# Patient Record
Sex: MALE | Race: WHITE | NOT HISPANIC OR LATINO | Employment: FULL TIME | URBAN - NONMETROPOLITAN AREA
[De-identification: names, ages, dates, MRNs, and addresses within clinical notes are randomized per-mention and may not be internally consistent; named-entity substitution may affect disease eponyms.]

---

## 2017-09-28 ENCOUNTER — OFFICE VISIT (OUTPATIENT)
Dept: SURGERY | Facility: CLINIC | Age: 27
End: 2017-09-28

## 2017-09-28 ENCOUNTER — ANESTHESIA EVENT (OUTPATIENT)
Dept: PERIOP | Facility: HOSPITAL | Age: 27
End: 2017-09-28

## 2017-09-28 ENCOUNTER — HOSPITAL ENCOUNTER (OUTPATIENT)
Facility: HOSPITAL | Age: 27
Setting detail: HOSPITAL OUTPATIENT SURGERY
Discharge: HOME | End: 2017-09-28
Attending: SURGERY | Admitting: SURGERY

## 2017-09-28 ENCOUNTER — ANESTHESIA (OUTPATIENT)
Dept: PERIOP | Facility: HOSPITAL | Age: 27
End: 2017-09-28

## 2017-09-28 VITALS
WEIGHT: 166.89 LBS | TEMPERATURE: 97.4 F | DIASTOLIC BLOOD PRESSURE: 84 MMHG | HEIGHT: 71 IN | OXYGEN SATURATION: 97 % | RESPIRATION RATE: 20 BRPM | BODY MASS INDEX: 23.36 KG/M2 | SYSTOLIC BLOOD PRESSURE: 129 MMHG | HEART RATE: 89 BPM

## 2017-09-28 VITALS
SYSTOLIC BLOOD PRESSURE: 116 MMHG | HEIGHT: 70 IN | DIASTOLIC BLOOD PRESSURE: 64 MMHG | WEIGHT: 167 LBS | BODY MASS INDEX: 23.91 KG/M2

## 2017-09-28 DIAGNOSIS — L02.91 ABSCESS: Primary | ICD-10-CM

## 2017-09-28 DIAGNOSIS — L02.91 ABSCESS: ICD-10-CM

## 2017-09-28 LAB — POTASSIUM BLD-SCNC: 3.8 MMOL/L (ref 3.5–5.1)

## 2017-09-28 PROCEDURE — 88304 TISSUE EXAM BY PATHOLOGIST: CPT | Performed by: SURGERY

## 2017-09-28 PROCEDURE — 84132 ASSAY OF SERUM POTASSIUM: CPT | Performed by: ANESTHESIOLOGY

## 2017-09-28 PROCEDURE — 25010000003 CEFAZOLIN PER 500 MG: Performed by: NURSE ANESTHETIST, CERTIFIED REGISTERED

## 2017-09-28 PROCEDURE — 25010000002 SUCCINYLCHOLINE PER 20 MG: Performed by: NURSE ANESTHETIST, CERTIFIED REGISTERED

## 2017-09-28 PROCEDURE — 25010000002 PROPOFOL 10 MG/ML EMULSION: Performed by: NURSE ANESTHETIST, CERTIFIED REGISTERED

## 2017-09-28 PROCEDURE — 25010000002 FENTANYL CITRATE (PF) 100 MCG/2ML SOLUTION: Performed by: NURSE ANESTHETIST, CERTIFIED REGISTERED

## 2017-09-28 PROCEDURE — 25010000002 DEXAMETHASONE PER 1 MG: Performed by: NURSE ANESTHETIST, CERTIFIED REGISTERED

## 2017-09-28 PROCEDURE — 88304 TISSUE EXAM BY PATHOLOGIST: CPT | Performed by: PATHOLOGY

## 2017-09-28 PROCEDURE — 10060 I&D ABSCESS SIMPLE/SINGLE: CPT | Performed by: SURGERY

## 2017-09-28 PROCEDURE — 99203 OFFICE O/P NEW LOW 30 MIN: CPT | Performed by: SURGERY

## 2017-09-28 PROCEDURE — 25010000002 MIDAZOLAM PER 1 MG: Performed by: NURSE ANESTHETIST, CERTIFIED REGISTERED

## 2017-09-28 RX ORDER — FENTANYL CITRATE 50 UG/ML
INJECTION, SOLUTION INTRAMUSCULAR; INTRAVENOUS AS NEEDED
Status: DISCONTINUED | OUTPATIENT
Start: 2017-09-28 | End: 2017-09-28 | Stop reason: SURG

## 2017-09-28 RX ORDER — LIDOCAINE HYDROCHLORIDE 10 MG/ML
0.5 INJECTION, SOLUTION INFILTRATION; PERINEURAL ONCE AS NEEDED
Status: DISCONTINUED | OUTPATIENT
Start: 2017-09-28 | End: 2017-09-28 | Stop reason: HOSPADM

## 2017-09-28 RX ORDER — PROPOFOL 10 MG/ML
VIAL (ML) INTRAVENOUS AS NEEDED
Status: DISCONTINUED | OUTPATIENT
Start: 2017-09-28 | End: 2017-09-28 | Stop reason: SURG

## 2017-09-28 RX ORDER — CLINDAMYCIN PHOSPHATE 900 MG/50ML
900 INJECTION INTRAVENOUS ONCE
Status: DISCONTINUED | OUTPATIENT
Start: 2017-09-28 | End: 2017-09-28 | Stop reason: HOSPADM

## 2017-09-28 RX ORDER — DEXAMETHASONE SODIUM PHOSPHATE 4 MG/ML
INJECTION, SOLUTION INTRA-ARTICULAR; INTRALESIONAL; INTRAMUSCULAR; INTRAVENOUS; SOFT TISSUE AS NEEDED
Status: DISCONTINUED | OUTPATIENT
Start: 2017-09-28 | End: 2017-09-28 | Stop reason: SURG

## 2017-09-28 RX ORDER — HYDROMORPHONE HCL 110MG/55ML
0.5 PATIENT CONTROLLED ANALGESIA SYRINGE INTRAVENOUS
Status: DISCONTINUED | OUTPATIENT
Start: 2017-09-28 | End: 2017-09-28 | Stop reason: HOSPADM

## 2017-09-28 RX ORDER — DIPHENHYDRAMINE HYDROCHLORIDE 50 MG/ML
12.5 INJECTION INTRAMUSCULAR; INTRAVENOUS
Status: DISCONTINUED | OUTPATIENT
Start: 2017-09-28 | End: 2017-09-28 | Stop reason: HOSPADM

## 2017-09-28 RX ORDER — SODIUM CHLORIDE 0.9 % (FLUSH) 0.9 %
3 SYRINGE (ML) INJECTION AS NEEDED
Status: DISCONTINUED | OUTPATIENT
Start: 2017-09-28 | End: 2017-09-28 | Stop reason: HOSPADM

## 2017-09-28 RX ORDER — ACETAMINOPHEN 650 MG/1
650 SUPPOSITORY RECTAL ONCE AS NEEDED
Status: DISCONTINUED | OUTPATIENT
Start: 2017-09-28 | End: 2017-09-28 | Stop reason: HOSPADM

## 2017-09-28 RX ORDER — LABETALOL HYDROCHLORIDE 5 MG/ML
5 INJECTION, SOLUTION INTRAVENOUS
Status: DISCONTINUED | OUTPATIENT
Start: 2017-09-28 | End: 2017-09-28 | Stop reason: HOSPADM

## 2017-09-28 RX ORDER — HYDROCODONE BITARTRATE AND ACETAMINOPHEN 7.5; 325 MG/1; MG/1
1 TABLET ORAL EVERY 6 HOURS PRN
Refills: 0 | COMMUNITY
Start: 2017-09-25

## 2017-09-28 RX ORDER — NALOXONE HCL 0.4 MG/ML
0.2 VIAL (ML) INJECTION AS NEEDED
Status: DISCONTINUED | OUTPATIENT
Start: 2017-09-28 | End: 2017-09-28 | Stop reason: HOSPADM

## 2017-09-28 RX ORDER — HYDROCODONE BITARTRATE AND ACETAMINOPHEN 7.5; 325 MG/1; MG/1
1-2 TABLET ORAL EVERY 6 HOURS PRN
Qty: 20 TABLET | Refills: 0 | Status: SHIPPED | OUTPATIENT
Start: 2017-09-28

## 2017-09-28 RX ORDER — SODIUM CHLORIDE, SODIUM GLUCONATE, SODIUM ACETATE, POTASSIUM CHLORIDE, AND MAGNESIUM CHLORIDE 526; 502; 368; 37; 30 MG/100ML; MG/100ML; MG/100ML; MG/100ML; MG/100ML
1000 INJECTION, SOLUTION INTRAVENOUS CONTINUOUS PRN
Status: DISCONTINUED | OUTPATIENT
Start: 2017-09-28 | End: 2017-09-28 | Stop reason: HOSPADM

## 2017-09-28 RX ORDER — SUCCINYLCHOLINE CHLORIDE 20 MG/ML
INJECTION INTRAMUSCULAR; INTRAVENOUS AS NEEDED
Status: DISCONTINUED | OUTPATIENT
Start: 2017-09-28 | End: 2017-09-28 | Stop reason: SURG

## 2017-09-28 RX ORDER — MIDAZOLAM HYDROCHLORIDE 1 MG/ML
INJECTION INTRAMUSCULAR; INTRAVENOUS AS NEEDED
Status: DISCONTINUED | OUTPATIENT
Start: 2017-09-28 | End: 2017-09-28 | Stop reason: SURG

## 2017-09-28 RX ORDER — CEFAZOLIN SODIUM 1 G/3ML
INJECTION, POWDER, FOR SOLUTION INTRAMUSCULAR; INTRAVENOUS AS NEEDED
Status: DISCONTINUED | OUTPATIENT
Start: 2017-09-28 | End: 2017-09-28 | Stop reason: SURG

## 2017-09-28 RX ORDER — LIDOCAINE HYDROCHLORIDE 20 MG/ML
INJECTION, SOLUTION INFILTRATION; PERINEURAL AS NEEDED
Status: DISCONTINUED | OUTPATIENT
Start: 2017-09-28 | End: 2017-09-28 | Stop reason: SURG

## 2017-09-28 RX ORDER — SULFAMETHOXAZOLE AND TRIMETHOPRIM 800; 160 MG/1; MG/1
1 TABLET ORAL 2 TIMES DAILY
Refills: 0 | COMMUNITY
Start: 2017-09-25

## 2017-09-28 RX ORDER — ONDANSETRON 2 MG/ML
4 INJECTION INTRAMUSCULAR; INTRAVENOUS ONCE AS NEEDED
Status: DISCONTINUED | OUTPATIENT
Start: 2017-09-28 | End: 2017-09-28 | Stop reason: HOSPADM

## 2017-09-28 RX ORDER — HYDROCHLOROTHIAZIDE 12.5 MG/1
1 CAPSULE, GELATIN COATED ORAL DAILY
Refills: 2 | COMMUNITY
Start: 2017-09-26

## 2017-09-28 RX ORDER — ATORVASTATIN CALCIUM 40 MG/1
1 TABLET, FILM COATED ORAL DAILY
Refills: 4 | COMMUNITY
Start: 2017-09-15

## 2017-09-28 RX ORDER — BUPIVACAINE HYDROCHLORIDE AND EPINEPHRINE 5; 5 MG/ML; UG/ML
INJECTION, SOLUTION EPIDURAL; INTRACAUDAL; PERINEURAL AS NEEDED
Status: DISCONTINUED | OUTPATIENT
Start: 2017-09-28 | End: 2017-09-28 | Stop reason: HOSPADM

## 2017-09-28 RX ORDER — EPHEDRINE SULFATE 50 MG/ML
5 INJECTION, SOLUTION INTRAVENOUS ONCE AS NEEDED
Status: DISCONTINUED | OUTPATIENT
Start: 2017-09-28 | End: 2017-09-28 | Stop reason: HOSPADM

## 2017-09-28 RX ORDER — FLUMAZENIL 0.1 MG/ML
0.2 INJECTION INTRAVENOUS AS NEEDED
Status: DISCONTINUED | OUTPATIENT
Start: 2017-09-28 | End: 2017-09-28 | Stop reason: HOSPADM

## 2017-09-28 RX ORDER — ACETAMINOPHEN 325 MG/1
650 TABLET ORAL ONCE AS NEEDED
Status: DISCONTINUED | OUTPATIENT
Start: 2017-09-28 | End: 2017-09-28 | Stop reason: HOSPADM

## 2017-09-28 RX ADMIN — SUCCINYLCHOLINE CHLORIDE 160 MG: 20 INJECTION, SOLUTION INTRAMUSCULAR; INTRAVENOUS at 13:20

## 2017-09-28 RX ADMIN — MIDAZOLAM 2 MG: 1 INJECTION INTRAMUSCULAR; INTRAVENOUS at 13:13

## 2017-09-28 RX ADMIN — LIDOCAINE HYDROCHLORIDE 80 MG: 20 INJECTION, SOLUTION INFILTRATION; PERINEURAL at 13:20

## 2017-09-28 RX ADMIN — CEFAZOLIN SODIUM 2 G: 1 INJECTION, POWDER, FOR SOLUTION INTRAMUSCULAR; INTRAVENOUS at 13:30

## 2017-09-28 RX ADMIN — PROPOFOL 200 MG: 10 INJECTION, EMULSION INTRAVENOUS at 13:20

## 2017-09-28 RX ADMIN — FENTANYL CITRATE 100 MCG: 50 INJECTION, SOLUTION INTRAMUSCULAR; INTRAVENOUS at 13:20

## 2017-09-28 RX ADMIN — SODIUM CHLORIDE, SODIUM GLUCONATE, SODIUM ACETATE, POTASSIUM CHLORIDE, AND MAGNESIUM CHLORIDE 1000 ML: 526; 502; 368; 37; 30 INJECTION, SOLUTION INTRAVENOUS at 12:43

## 2017-09-28 RX ADMIN — DEXAMETHASONE SODIUM PHOSPHATE 5 MG: 4 INJECTION, SOLUTION INTRAMUSCULAR; INTRAVENOUS at 13:39

## 2017-09-28 NOTE — ANESTHESIA POSTPROCEDURE EVALUATION
Patient: Jonah Davenport Stone    Procedure Summary     Date Anesthesia Start Anesthesia Stop Room / Location    09/28/17 1315 1403 [facility] OR 10 / [facility]       Procedure Diagnosis Surgeon Provider    INCISION AND DRAINAGE ABSCESS right neck (Right ) Abscess  (Abscess [L02.91]) MD James Sheldon MD          Anesthesia Type: general  Last vitals  BP   136/78 (09/28/17 1358)    Temp   99.7 °F (37.6 °C) (09/28/17 1358)    Pulse   102 (09/28/17 1358)   Resp   20 (09/28/17 1358)    SpO2   100 % (09/28/17 1358)      Post Anesthesia Care and Evaluation    Patient location during evaluation: PACU  Patient participation: complete - patient participated  Level of consciousness: awake and alert  Airway patency: patent  Anesthetic complications: No anesthetic complications    Cardiovascular status: acceptable  Respiratory status: acceptable  Hydration status: acceptable

## 2017-09-28 NOTE — ANESTHESIA PREPROCEDURE EVALUATION
Anesthesia Evaluation     NPO Solid Status: > 8 hours  NPO Liquid Status: > 8 hours     Airway   Mallampati: II  TM distance: >3 FB  Neck ROM: full  no difficulty expected  Dental - normal exam     Pulmonary     breath sounds clear to auscultation  (+) a smoker Current,   Cardiovascular     Rhythm: regular  Rate: normal        Neuro/Psych  GI/Hepatic/Renal/Endo      Musculoskeletal     Abdominal    Substance History      OB/GYN          Other            Phys Exam Other: Right neck draining lesion probably infected cyst.  Chronic cyst which has expanded in size and has begun to drain.  Plan for RSI and intubation.                                Anesthesia Plan    ASA 2     general     intravenous induction   Anesthetic plan and risks discussed with patient.

## 2017-09-28 NOTE — PROGRESS NOTES
Subjective   Jonah Perez is a 27 y.o. male.   Referral from [facility] neck cyst  History of Present Illness   Mr. Perez is 27-year-old gentleman with several year history of epidermal cyst on his right neck.  ITs never given him a problem before.  But a week ago started hurting and swelling and draining.  He is about advice about the improvement.  Pain is rhinitis constant.  Discharging pus today.  Lortab helps the pain.    Past history of high blood pressure and high cholesterol.    Past surgical history of tonsillectomy.      Current Outpatient Prescriptions:   •  atorvastatin (LIPITOR) 40 MG tablet, Take 1 tablet by mouth Daily., Disp: , Rfl: 4  •  hydrochlorothiazide (MICROZIDE) 12.5 MG capsule, Take 1 capsule by mouth Daily., Disp: , Rfl: 2  •  HYDROcodone-acetaminophen (NORCO) 7.5-325 MG per tablet, Take 1 tablet by mouth Every 6 (Six) Hours As Needed., Disp: , Rfl: 0  •  sulfamethoxazole-trimethoprim (BACTRIM DS,SEPTRA DS) 800-160 MG per tablet, Take 1 tablet by mouth 2 (Two) Times a Day., Disp: , Rfl: 0    Allergic to penicillin.    Family history of diabetes and high blood pressure.    Social history patient is a  at time while a single.  He smokes pack a day cigarettes doesn't use alcohol.  The following portions of the patient's history were reviewed and updated as appropriate: allergies, current medications, past family history, past medical history, past social history, past surgical history and problem list.    Review of Systems   Constitutional: Negative.    HENT: Negative.    Eyes: Negative.    Respiratory: Negative.    Cardiovascular: Negative.    Gastrointestinal: Negative.    Endocrine: Negative.    Genitourinary: Negative.    Musculoskeletal: Negative.    Skin: Negative.    Allergic/Immunologic: Negative.    Neurological: Negative.    Hematological: Negative.    Psychiatric/Behavioral: Negative.        Objective   Physical Exam   Constitutional: He is oriented to person,  place, and time. He appears well-developed.   HENT:   Head: Normocephalic and atraumatic.   Eyes: Pupils are equal, round, and reactive to light.   Neck: Normal range of motion. Neck supple.   Cardiovascular: Normal rate, regular rhythm, normal heart sounds and intact distal pulses.    Pulmonary/Chest: Effort normal and breath sounds normal.   Abdominal: Soft. Bowel sounds are normal.   Musculoskeletal: Normal range of motion.   Neurological: He is alert and oriented to person, place, and time.   Skin: Skin is warm and dry. There is erythema.   Psychiatric: He has a normal mood and affect. His behavior is normal.   On examination of his right anchors raise swollen erythematous 2 cm lesion consistent with an infected epidermal cyst.  He is draining parents and fluctuant and indurated.    Assessment/Plan   Jonah was seen today for cyst.    Diagnoses and all orders for this visit:    Abscess  -     Case Request; Standing  -     clindamycin (CLEOCIN) 900 mg in dextrose (D5W) 5 % 100 mL IVPB; Infuse 900 mg into a venous catheter 1 (One) Time.  -     Case Request    Other orders  -     Follow Anesthesia Guidelines / Standing Orders; Future  -     Follow Anesthesia Guidelines / Standing Orders; Standing  -     Verify NPO Status; Standing  -     Obtain Informed Consent; Standing  -     SCD (Sequential Compression Device) - Place on Patient in Pre-Op; Standing  -     Clip Operative Site; Standing  -     Have Patient Void Prior to Procedure; Standing     Mr. Perez is a 27-year-old gentleman infected right neck epidermal inclusion cyst.  Plan on taking the OR today for incision and drainage.  He has not eaten today and it is begun so as to get done today.  The procedure and risks, benefits, and alternatives to the plan have been discussed patient and he understands and agrees.  Thank you for the consult.

## 2017-09-28 NOTE — ANESTHESIA PROCEDURE NOTES
Airway  Urgency: elective    Airway not difficult    General Information and Staff    Patient location during procedure: OR  CRNA: STEPHANIE COMBS    Indications and Patient Condition  Indications for airway management: airway protection    Preoxygenated: yes  Mask difficulty assessment: 0 - not attempted    Final Airway Details  Final airway type: endotracheal airway      Successful airway: ETT  Cuffed: yes   Successful intubation technique: direct laryngoscopy  Endotracheal tube insertion site: oral  Blade: Berta  Blade size: #4  ETT size: 7.5 mm  Cormack-Lehane Classification: grade IIa - partial view of glottis  Placement verified by: chest auscultation and capnometry   Cuff volume (mL): 8  Measured from: lips  ETT to lips (cm): 21  Number of attempts at approach: 1

## 2017-10-02 LAB
LAB AP CASE REPORT: NORMAL
Lab: NORMAL
PATH REPORT.FINAL DX SPEC: NORMAL
PATH REPORT.GROSS SPEC: NORMAL

## 2017-10-05 ENCOUNTER — OFFICE VISIT (OUTPATIENT)
Dept: SURGERY | Facility: CLINIC | Age: 27
End: 2017-10-05

## 2017-10-05 VITALS
BODY MASS INDEX: 23.94 KG/M2 | SYSTOLIC BLOOD PRESSURE: 112 MMHG | DIASTOLIC BLOOD PRESSURE: 76 MMHG | HEIGHT: 71 IN | WEIGHT: 171 LBS

## 2017-10-05 DIAGNOSIS — L02.91 ABSCESS: Primary | ICD-10-CM

## 2017-10-05 PROCEDURE — 99024 POSTOP FOLLOW-UP VISIT: CPT | Performed by: SURGERY

## 2018-08-06 ENCOUNTER — TRANSCRIBE ORDERS (OUTPATIENT)
Dept: GENERAL RADIOLOGY | Facility: CLINIC | Age: 28
End: 2018-08-06

## 2018-08-06 DIAGNOSIS — M54.6 PAIN IN THORACIC SPINE: ICD-10-CM

## 2018-08-06 DIAGNOSIS — M25.511 RIGHT SHOULDER PAIN, UNSPECIFIED CHRONICITY: Primary | ICD-10-CM

## 2018-08-13 ENCOUNTER — TRANSCRIBE ORDERS (OUTPATIENT)
Dept: PHYSICAL THERAPY | Facility: HOSPITAL | Age: 28
End: 2018-08-13

## 2018-08-13 DIAGNOSIS — S33.5XXA LUMBAR BACK SPRAIN, INITIAL ENCOUNTER: Primary | ICD-10-CM

## 2018-08-15 ENCOUNTER — HOSPITAL ENCOUNTER (OUTPATIENT)
Dept: PHYSICAL THERAPY | Facility: HOSPITAL | Age: 28
Setting detail: THERAPIES SERIES
Discharge: HOME | End: 2018-08-15

## 2018-08-15 DIAGNOSIS — S33.5XXA LUMBAR BACK SPRAIN, INITIAL ENCOUNTER: ICD-10-CM

## 2018-08-15 PROCEDURE — 97161 PT EVAL LOW COMPLEX 20 MIN: CPT | Performed by: PHYSICAL THERAPIST

## 2018-08-15 PROCEDURE — 97140 MANUAL THERAPY 1/> REGIONS: CPT | Performed by: PHYSICAL THERAPIST

## 2018-08-15 PROCEDURE — 97110 THERAPEUTIC EXERCISES: CPT | Performed by: PHYSICAL THERAPIST

## 2018-08-15 PROCEDURE — G0283 ELEC STIM OTHER THAN WOUND: HCPCS | Performed by: PHYSICAL THERAPIST

## 2018-08-15 NOTE — THERAPY EVALUATION
Outpatient Physical Therapy Ortho Initial Evaluation  [facility]     Patient Name: Jonah Perez  : 1990  MRN: [id number]  Today's Date: 8/15/2018        Attendance    Authorized 5   Pre Rx pain 6   Post Rx pain 5   % improvement N/A   MD follow up    Recert date            Visit Date: 08/15/2018    Patient Active Problem List   Diagnosis   (none) - all problems resolved or deleted        Past Medical History:   Diagnosis Date   • Hypertension         Past Surgical History:   Procedure Laterality Date   • INCISION AND DRAINAGE ABSCESS Right 2017    Procedure: INCISION AND DRAINAGE ABSCESS right neck;  Surgeon: Jeramy Moss MD;  Location: [facility];  Service:    • TONSILLECTOMY     Medicines: hydrochlorothiazide, flexeril, BP med??? name  Allergies; penicillins    Visit Dx:     ICD-10-CM ICD-9-CM   1. Lumbar back sprain, initial encounter S33.5XXA 847.2             Patient History     Row Name 08/15/18 1300             History    Chief Complaint Pain  -DD      Type of Pain Back pain  -DD      Date Current Problem(s) Began 18  -DD      Brief Description of Current Complaint shoveling plastic residue.into a tall box.   -DD      Hand Dominance right-handed  -DD      Occupation/sports/leisure activities [employer] in [place]   restricted duty  -DD      What clinical tests have you had for this problem? X-ray  -DD      Results of Clinical Tests negative  -DD         Pain     Pain Location Back;Rib cage  -DD      Pain at Present 6  -DD      Pain Frequency Constant/continuous  -DD        User Key  (r) = Recorded By, (t) = Taken By, (c) = Cosigned By    Initials Name Provider Type    DD Josiane Garza PT Physical Therapist                PT Ortho     Row Name 08/15/18 1300       Subjective Pain    Able to rate subjective pain? yes  -DD    Pre-Treatment Pain Level 3  -DD    Subjective Pain Comment movement 6-7  -DD       Posture/Observations    Posture/Observations Comments  "slumped rounded shouder posture   limited UE to 130  -DD       Special Tests/Palpation    Special Tests/Palpation --   R Thor paraspinals post, ribs flared, R SI tender.  -DD       General ROM    Head/Neck/Trunk --   hypomobile thoracic spine  -DD    GENERAL ROM COMMENTS lumbar flexion, mid shin, ext 20, lateral flexion 8 \" form right and 10 from Left.  -DD       MMT (Manual Muscle Testing)    Additional Documentation --   5/5  -DD       Sensation    Sensation WNL? WNL  -DD    Light Touch No apparent deficits  -DD      User Key  (r) = Recorded By, (t) = Taken By, (c) = Cosigned By    Initials Name Provider Type    Josiane Cruz, PT Physical Therapist                      Therapy Education  Given: HEP  Program: New  How Provided: Verbal, Written  Provided to: Patient  Level of Understanding: Verbalized, Demonstrated           PT OP Goals     Row Name 08/15/18 1300          PT Short Term Goals    STG Date to Achieve 09/05/18  -DD     STG 1 Pt will have normal lumbar mobility to prior level of function  -DD     STG 2 Pt will be nontender over right thoracic paraspinals  -DD     STG 3 Pt will have Mod Oswestry score 20% or lowe  -DD     STG 4 pt will have painfree UE ROM  -DD        Time Calculation    PT Goal Re-Cert Due Date 09/05/18  -DD       User Key  (r) = Recorded By, (t) = Taken By, (c) = Cosigned By    Initials Name Provider Type    Josiane Cruz, PT Physical Therapist                PT Assessment/Plan     Row Name 08/15/18 1355          PT Assessment    Functional Limitations Limitation in home management;Performance in leisure activities;Performance in work activities;Performance in self-care ADL  -DD     Impairments Pain;Range of motion;Poor body mechanics;Posture;Joint mobility;Impaired flexibility  -DD     Assessment Comments Pt has biomechanical back pain from strain of muscles and spasm.  Will beneifit from mobilization and stretching, modalities  -DD     Please refer to paper " "survey for additional self-reported information Yes  -DD     Rehab Potential Good  -DD     Patient/caregiver participated in establishment of treatment plan and goals Yes  -DD     Patient would benefit from skilled therapy intervention Yes  -DD        PT Plan    PT Frequency 3x/week;2x/week  -DD     Predicted Duration of Therapy Intervention (Therapy Eval) 3 weeks  -DD     Planned CPT's? PT EVAL LOW COMPLEXITY: 65946;PT THER PROC EA 15 MIN: 32713;PT MANUAL THERAPY EA 15 MIN: 75388;PT ELECTRICAL STIM UNATTEND: ;PT HOT/COLD PACK WC NONMCARE: 91349  -DD     PT Plan Comments Pt approved for 5 vists at this time, progress stretching and scapular stability as able. Mobes of ribs and thoracic spine,  watch lumbars and SI joint.  estim and ice.  -DD       User Key  (r) = Recorded By, (t) = Taken By, (c) = Cosigned By    Initials Name Provider Type    Josiane Cruz, PT Physical Therapist                Modalities     Row Name 08/15/18 1300             Ice    Ice Applied Yes  -DD      Location thoracic paraspinals  -DD      Rx Minutes 15 mins  -DD      Ice S/P Rx Yes  -DD         ELECTRICAL STIMULATION    Attended/Unattended Unattended  -DD      Stimulation Type IFC  -DD      Location/Electrode Placement/Other thoracic paraspinals  -DD        User Key  (r) = Recorded By, (t) = Taken By, (c) = Cosigned By    Initials Name Provider Type    Josiane Cruz, PT Physical Therapist              Exercises     Row Name 08/15/18 1300             Subjective Pain    Able to rate subjective pain? yes  -DD      Pre-Treatment Pain Level 3  -DD      Subjective Pain Comment movement 6-7  -DD         Exercise 1    Exercise Name 1 scap squeezes  -DD      Reps 1 15  -DD         Exercise 2    Exercise Name 2 posterior shoulder stretch  -DD      Reps 2 2/30\" sabrina  -DD         Exercise 3    Exercise Name 3 pec stretch  -DD      Reps 3 3/30\"  -DD         Exercise 4    Exercise Name 4 mid back stretch  -DD      Reps 4 3/30\" " " -DD         Exercise 5    Exercise Name 5 lat stretch  -DD      Reps 5 3/30\"  -DD        User Key  (r) = Recorded By, (t) = Taken By, (c) = Cosigned By    Initials Name Provider Type    DD Josiane Garza, PT Physical Therapist           Manual Rx (last 36 hours)      Manual Treatments     Row Name 08/15/18 1300             Manual Rx 1    Manual Rx 1 Location Mobes PA thoracics/cervical  -DD      Manual Rx 1 Grade 3-4-5  -DD      Manual Rx 1 Duration 9'  -DD         Manual Rx 2    Manual Rx 2 Location throacic paraspinals  -DD      Manual Rx 2 Type cuppping  -DD      Manual Rx 2 Duration 5  -DD        User Key  (r) = Recorded By, (t) = Taken By, (c) = Cosigned By    Initials Name Provider Type    DD Josiane Garza, PT Physical Therapist                      Outcome Measure Options: Modifed Owestry  Modified Oswestry  Modified Oswestry Score/Comments: 32%      Time Calculation:     Therapy Suggested Charges     Code   Minutes Charges    None             Start Time: 1302  Stop Time: 1405  Time Calculation (min): 63 min     Therapy Charges for Today     Code Description Service Date Service Provider Modifiers Qty    00217384303 HC PT EVAL LOW COMPLEXITY 1 8/15/2018 Josiane Garza, PT GP 1    66021152369 HC PT ELECTRICAL STIM UNATTENDED 8/15/2018 Josiane Garza, PT  1    74549672497 HC PT THER PROC EA 15 MIN 8/15/2018 Josiane Garza, PT GP 1    09273267713 HC PT MANUAL THERAPY EA 15 MIN 8/15/2018 Josiane Garza, PT GP 1          PT G-Codes  Outcome Measure Options: Modifed Owestry         Josiane Garza, PT, ATC, DPT  8/15/2018      "

## 2018-08-17 ENCOUNTER — HOSPITAL ENCOUNTER (OUTPATIENT)
Dept: PHYSICAL THERAPY | Facility: HOSPITAL | Age: 28
Setting detail: THERAPIES SERIES
Discharge: HOME | End: 2018-08-17

## 2018-08-17 DIAGNOSIS — S33.5XXA LUMBAR BACK SPRAIN, INITIAL ENCOUNTER: Primary | ICD-10-CM

## 2018-08-17 PROCEDURE — 97110 THERAPEUTIC EXERCISES: CPT

## 2018-08-17 PROCEDURE — G0283 ELEC STIM OTHER THAN WOUND: HCPCS

## 2018-08-17 PROCEDURE — 97140 MANUAL THERAPY 1/> REGIONS: CPT

## 2018-08-17 NOTE — THERAPY TREATMENT NOTE
"    Outpatient Physical Therapy Ortho Treatment Note  [facility]     Patient Name: Jonah Perez  : 1990  MRN: [id number]  Today's Date: 2018      Visit Date: 2018    Visit Dx:    ICD-10-CM ICD-9-CM   1. Lumbar back sprain, initial encounter S33.5XXA 847.2       Patient Active Problem List   Diagnosis   (none) - all problems resolved or deleted        Past Medical History:   Diagnosis Date   • Hypertension         Past Surgical History:   Procedure Laterality Date   • INCISION AND DRAINAGE ABSCESS Right 2017    Procedure: INCISION AND DRAINAGE ABSCESS right neck;  Surgeon: Jeramy Moss MD;  Location: [facility];  Service:    • TONSILLECTOMY               PT Ortho     Row Name 08/15/18 1300       Subjective Pain    Able to rate subjective pain? yes  -DD    Pre-Treatment Pain Level 3  -DD    Subjective Pain Comment movement 6-7  -DD       Posture/Observations    Posture/Observations Comments slumped rounded shouder posture   limited UE to 130  -DD       Special Tests/Palpation    Special Tests/Palpation --   R Thor paraspinals post, ribs flared, R SI tender.  -DD       General ROM    Head/Neck/Trunk --   hypomobile thoracic spine  -DD    GENERAL ROM COMMENTS lumbar flexion, mid shin, ext 20, lateral flexion 8 \" form right and 10 from Left.  -DD       MMT (Manual Muscle Testing)    Additional Documentation --   5/5  -DD       Sensation    Sensation WNL? WNL  -DD    Light Touch No apparent deficits  -DD      User Key  (r) = Recorded By, (t) = Taken By, (c) = Cosigned By    Initials Name Provider Type    Josiane Cruz PT Physical Therapist                                Modalities     Row Name 18 0951             Ice    Ice Applied Yes  -MS      Location thoracic paraspinals  -MS      Rx Minutes 15 mins  -MS      Ice S/P Rx Yes  -MS         ELECTRICAL STIMULATION    Attended/Unattended Unattended  -MS      Stimulation Type IFC  -MS      Location/Electrode Placement/Other " "thoracic paraspinals  -MS        User Key  (r) = Recorded By, (t) = Taken By, (c) = Cosigned By    Initials Name Provider Type    Omar Leal, PT Physical Therapist                Exercises     Row Name 08/17/18 0951             Subjective Pain    Able to rate subjective pain? yes  -MS      Pre-Treatment Pain Level 2  -MS      Subjective Pain Comment 6 with movement  -MS         Exercise 1    Exercise Name 1 scap squeezes  -MS      Reps 1 15  -MS      Time 1 10\" hold  -MS         Exercise 2    Exercise Name 2 posterior shoulder stretch  -MS      Reps 2 2/30\" sabrina  -MS         Exercise 3    Exercise Name 3 pec stretch  -MS      Reps 3 3/30\"  -MS         Exercise 4    Exercise Name 4 mid back stretch  -MS      Reps 4 3/30\"  -MS         Exercise 5    Exercise Name 5 lat stretch  -MS      Reps 5 3/30\"  -MS         Exercise 6    Exercise Name 6 thoracic rotational stretch on mat table with knee on roller  -MS      Cueing 6 Verbal;Demo  -MS      Sets 6 1  -MS      Reps 6 3  -MS      Time 6 X 1' B  -MS        User Key  (r) = Recorded By, (t) = Taken By, (c) = Cosigned By    Initials Name Provider Type    Omar Leal, PT Physical Therapist                        Manual Rx (last 36 hours)      Manual Treatments     Row Name 08/17/18 0951             Manual Rx 1    Manual Rx 1 Location STM to R paraspinals to medial/inferior border of scapula  -MS      Manual Rx 1 Duration 10  -MS        User Key  (r) = Recorded By, (t) = Taken By, (c) = Cosigned By    Initials Name Provider Type    Omar Leal, PT Physical Therapist                             Time Calculation:   Start Time: 0951  Stop Time: 1102  Time Calculation (min): 71 min  Total Timed Code Minutes- PT: 51 minute(s)  Therapy Suggested Charges     Code   Minutes Charges    None           Therapy Charges for Today     Code Description Service Date Service Provider Modifiers Qty    87096674665  PT ELECTRICAL STIM UNATTENDED 8/17/2018 " Omar Shearer, PT  1    45627834143 HC PT MANUAL THERAPY EA 15 MIN 8/17/2018 Omar Shearer, PT GP 1    70364549687 HC PT THER PROC EA 15 MIN 8/17/2018 Omar Shearer, PT GP 2                    Omar Shearer, PT  8/17/2018

## 2018-08-21 ENCOUNTER — HOSPITAL ENCOUNTER (OUTPATIENT)
Dept: PHYSICAL THERAPY | Facility: HOSPITAL | Age: 28
Setting detail: THERAPIES SERIES
Discharge: HOME | End: 2018-08-21

## 2018-08-21 DIAGNOSIS — S33.5XXA LUMBAR BACK SPRAIN, INITIAL ENCOUNTER: Primary | ICD-10-CM

## 2018-08-21 PROCEDURE — 97110 THERAPEUTIC EXERCISES: CPT

## 2018-08-21 PROCEDURE — 97032 APPL MODALITY 1+ESTIM EA 15: CPT

## 2018-08-21 NOTE — THERAPY TREATMENT NOTE
Outpatient Physical Therapy Ortho Treatment Note  [facility]   Janey Martínez       Patient Name: Jonah Perez  : 1990  MRN: [id number]  Today's Date: 2018      Visit Date: 2018   Pt reports 0/10 pain pre treatment, 4/10 pain post treatment  Reports ??% of improvement.  Attended 3/3 visits.  Insurance available: 5 visit   Next MD appt: 2018.  Recertification: 2018.    Visit Dx:    ICD-10-CM ICD-9-CM   1. Lumbar back sprain, initial encounter S33.5XXA 847.2       Patient Active Problem List   Diagnosis   (none) - all problems resolved or deleted        Past Medical History:   Diagnosis Date   • Hypertension         Past Surgical History:   Procedure Laterality Date   • INCISION AND DRAINAGE ABSCESS Right 2017    Procedure: INCISION AND DRAINAGE ABSCESS right neck;  Surgeon: Jeramy Moss MD;  Location: [facility];  Service:    • TONSILLECTOMY                               PT Assessment/Plan     Row Name 18 1300          PT Assessment    Assessment Comments (P)  Muscle spasms R paraspinals and R latissimus dorsi. Some improvement with MFR and thoracic mobes.Increased pain reported post-Esim   -        PT Plan    PT Frequency (P)  3x/week;2x/week  -     Predicted Duration of Therapy Intervention (Therapy Eval) (P)  3 weeks  -     PT Plan Comments (P)  Continue with POC established at initial visit--stretching, mobes, scap strengthening   -       User Key  (r) = Recorded By, (t) = Taken By, (c) = Cosigned By    Initials Name Provider Type     Janey Martínez                 Modalities     Row Name 18 1300             Ice    Ice Applied (P)  Yes  -MC      Location (P)  R thoracic paraspinas and R lat   concurrent with IFC   -MC      Rx Minutes (P)  15 mins  -      Ice Prior to Rx (P)  No  -      Ice S/P Rx (P)  Yes  -         ELECTRICAL STIMULATION    Attended/Unattended (P)  Unattended  -      Stimulation Type (P)  IFC  "  concurrent with ice  -      Location/Electrode Placement/Other (P)  R thoracic paraspinals and R lat  -      66313 - PT Electrical Stimulation (Manual) Minutes (P)  15  -MC        User Key  (r) = Recorded By, (t) = Taken By, (c) = Cosigned By    Initials Name Provider Type     Janey Martínez                 Exercises     Row Name 08/21/18 1300             Subjective Comments    Subjective Comments (P)  Not really having any pain except with twisting. Sits in a chair at work.   -         Subjective Pain    Able to rate subjective pain? (P)  yes  -      Pre-Treatment Pain Level (P)  0  -      Post-Treatment Pain Level (P)  4  -      Subjective Pain Comment (P)  4 with twisting   -         Exercise 1    Exercise Name 1 (P)  Tball rollouts-mid, L, R  -      Sets 1 (P)  3  -      Time 1 (P)  30\"  -      Additional Comments (P)  each direction  -         Exercise 2    Exercise Name 2 (P)  Posterior shoulder stretch  -      Sets 2 (P)  3  -      Time 2 (P)  30\"  -         Exercise 3    Exercise Name 3 (P)  Doorway pec stretch  -      Sets 3 (P)  3  -      Time 3 (P)  30\"  -         Exercise 4    Exercise Name 4 (P)  Lat stretch  -      Sets 4 (P)  3  -      Time 4 (P)  30\"  -         Exercise 5    Exercise Name 5 (P)  manual--see manual   -         Exercise 6    Exercise Name 6 (P)  Scap squeezes  -      Sets 6 (P)  2  -      Reps 6 (P)  15  -         Exercise 7    Exercise Name 7 (P)  Wall push up plus   -      Reps 7 (P)  20  -        User Key  (r) = Recorded By, (t) = Taken By, (c) = Cosigned By    Initials Name Provider Type     Janey Martínez                         Manual Rx (last 36 hours)      Manual Treatments     Row Name 08/21/18 1300             Manual Rx 1    Manual Rx 1 Location (P)  PA thoracic joint mobes   -      Manual Rx 1 Grade (P)  3  -      Manual Rx 1 Duration (P)  2 min  -         Manual Rx 2    " Manual Rx 2 Location (P)  MFR R lat dorsi, low-trap   -      Manual Rx 2 Duration (P)  3 min  -        User Key  (r) = Recorded By, (t) = Taken By, (c) = Cosigned By    Initials Name Provider Type    Janey Dover                 PT OP Goals     Row Name 08/21/18 1300          PT Short Term Goals    STG Date to Achieve (P)  09/05/18  -     STG 1 (P)  Pt will have normal lumbar mobility to prior level of function  -     STG 1 Progress (P)  Not Met  -     STG 2 (P)  Pt will be nontender over right thoracic paraspinals  -     STG 2 Progress (P)  Not Met  -     STG 3 (P)  Pt will have Mod Oswestry score 20% or lowe  -     STG 3 Progress (P)  Not Met  -     STG 4 (P)  pt will have painfree UE ROM  -     STG 4 Progress (P)  Not Met  -        Time Calculation    PT Goal Re-Cert Due Date (P)  09/05/18  -       User Key  (r) = Recorded By, (t) = Taken By, (c) = Cosigned By    Initials Name Provider Type    Janey Dover                          Time Calculation:   Start Time: (P) 1258  Stop Time: (P) 1353  Time Calculation (min): (P) 55 min  Therapy Suggested Charges     Code   Minutes Charges    46378 (CPT®) Hc Pt Neuromusc Re Education Ea 15 Min      25931 (CPT®) Hc Pt Ther Proc Ea 15 Min      28990 (CPT®) Hc Gait Training Ea 15 Min      30099 (CPT®) Hc Pt Therapeutic Act Ea 15 Min      97950 (CPT®) Hc Pt Manual Therapy Ea 15 Min      25314 (CPT®) Hc Pt Ther Massage- Per 15 Min      99875 (CPT®) Hc Pt Iontophoresis Ea 15 Min      72125 (CPT®) Hc Pt Elec Stim Ea-Per 15 Min 15 1    54184 (CPT®) Hc Pt Ultrasound Ea 15 Min      07275 (CPT®) Hc Pt Self Care/Mgmt/Train Ea 15 Min      89956 (CPT®) Hc Pt Prosthetic (S) Train Initial Encounter, Each 15 Min      42544 (CPT®) Hc Orthotic(S) Mgmt/Train Initial Encounter, Each 15min      68743 (CPT®) Hc Pt Aquatic Therapy Ea 15 Min      17621 (CPT®) Hc Pt Orthotic(S)/Prosthetic(S) Encounter, Each 15 Min      Total   15 1        Therapy Charges for Today     Code Description Service Date Service Provider Modifiers Qty    97146115747 HC PT ELEC STIM EA-PER 15 MIN 8/21/2018 Janey Martínez GP 1    05712406154 HC PT THER SUPP EA 15 MIN 8/21/2018 Janey Martínez GP 1    16917389426 HC PT THER PROC EA 15 MIN 8/21/2018 Janey Martínez GP 3                    Janey Martínez  8/21/2018

## 2018-08-23 ENCOUNTER — HOSPITAL ENCOUNTER (OUTPATIENT)
Dept: PHYSICAL THERAPY | Facility: HOSPITAL | Age: 28
Setting detail: THERAPIES SERIES
Discharge: HOME | End: 2018-08-23

## 2018-08-23 DIAGNOSIS — S33.5XXA LUMBAR BACK SPRAIN, INITIAL ENCOUNTER: Primary | ICD-10-CM

## 2018-08-23 PROCEDURE — G0283 ELEC STIM OTHER THAN WOUND: HCPCS

## 2018-08-23 PROCEDURE — 97110 THERAPEUTIC EXERCISES: CPT

## 2018-08-23 NOTE — THERAPY TREATMENT NOTE
Outpatient Physical Therapy Ortho Treatment Note  [facility]     Patient Name: Jonah Perez  : 1990  MRN: [id number]  Today's Date: 2018      Visit Date: 2018    Sub imp 80%  Visit  (5)  MD 18  Re 18    MD note faxed    Visit Dx:    ICD-10-CM ICD-9-CM   1. Lumbar back sprain, initial encounter S33.5XXA 847.2       Patient Active Problem List   Diagnosis   (none) - all problems resolved or deleted        Past Medical History:   Diagnosis Date   • Hypertension         Past Surgical History:   Procedure Laterality Date   • INCISION AND DRAINAGE ABSCESS Right 2017    Procedure: INCISION AND DRAINAGE ABSCESS right neck;  Surgeon: Jeramy Moss MD;  Location: [facility];  Service:    • TONSILLECTOMY               PT Ortho     Row Name 18 1100       General ROM    GENERAL ROM COMMENTS (P)  Lumbar flex mid shin, R Lat flex Fib head. L lat flex Distal thigh with pain, ext 30 degrees  -SEGUNDO      User Key  (r) = Recorded By, (t) = Taken By, (c) = Cosigned By    Initials Name Provider Type    SEGUNDO Mirza Roe, [employer]                             PT Assessment/Plan     Row Name 18 1147          PT Assessment    Assessment Comments (P)  multiple cavitations with SVETLANA clayton MD note faxed  -SEGUNDO        PT Plan    PT Frequency (P)  3x/week  -SEGUNDO     Predicted Duration of Therapy Intervention (Therapy Eval) (P)  3 weeks  -SEGUNDO     PT Plan Comments (P)  MD apt tomorrow. 1 approved visit remaining  -SEGUNDO       User Key  (r) = Recorded By, (t) = Taken By, (c) = Cosigned By    Initials Name Provider Type    Mirza Sanchez, [employer]                     Exercises     Row Name 18 1100             Subjective Comments    Subjective Comments (P)  Reports no pain with standing, cont pain with rotation. Feels tight  -SEGUNDO         Subjective Pain    Pre-Treatment Pain Level (P)  0  -SEGUNDO         Exercise 1    Exercise Name 1 (P)  Pro II seat 10 UE  -SEGUNDO      Sets 1 (P)  2   -SEGUNDO         Exercise 2    Exercise Name 2 (P)  Door Lat stretch  -SEGUNDO      Sets 2 (P)  3  -SEGUNDO      Time 2 (P)  30  -SEGUNDO         Exercise 3    Exercise Name 3 (P)  Post. shoulder stretch  -SEGUNDO      Sets 3 (P)  3  -SEGUNDO      Time 3 (P)  30  -SEGUNDO         Exercise 4    Exercise Name 4 (P)  Mid back stretch  -SEGUNDO      Sets 4 (P)  3  -SEGUNDO      Time 4 (P)  30  -SEGUNDO         Exercise 5    Exercise Name 5 (P)  Manual see manual  -SEGUNDO         Exercise 6    Exercise Name 6 (P)  Measurements  -SEGUNDO         Exercise 7    Exercise Name 7 (P)  Sidelying thoracic stretch  -SEGUNDO      Sets 7 (P)  3  -SEGUNDO      Time 7 (P)  30  -SEGUNDO        User Key  (r) = Recorded By, (t) = Taken By, (c) = Cosigned By    Initials Name Provider Type    Mirza Sanchez, [facility]                         Manual Rx (last 36 hours)      Manual Treatments     Row Name 08/23/18 1100             Manual Rx 1    Manual Rx 1 Location (P)  PA thoracic joint mobes   -SEGUNDO      Manual Rx 1 Type (P)  Multiple cavitations  -SEGUNDO      Manual Rx 1 Grade (P)  3  -SEGUNDO      Manual Rx 1 Duration (P)  2 min  -SEGUNDO         Manual Rx 2    Manual Rx 2 Location (P)  MFR R lat dorsi, low-trap   -SEGUNDO      Manual Rx 2 Duration (P)  3 min  -SEGUNDO        User Key  (r) = Recorded By, (t) = Taken By, (c) = Cosigned By    Initials Name Provider Type    Mirza Sanchez, [facility]                 PT OP Goals     Row Name 08/23/18 1100          PT Short Term Goals    STG Date to Achieve (P)  09/05/18  -SEGUNDO     STG 1 (P)  Pt will have normal lumbar mobility to prior level of function  -SEGUNDO     STG 1 Progress (P)  Progressing  -SEGUNDO     STG 2 (P)  Pt will be nontender over right thoracic paraspinals  -SEGUNDO     STG 2 Progress (P)  Progressing  -SEGUNDO     STG 3 (P)  Pt will have Mod Oswestry score 20% or lowe  -SEGUNDO     STG 3 Progress (P)  Progressing  -SEGUNDO     STG 4 (P)  pt will have painfree UE ROM  -SEGUNDO     STG 4 Progress (P)  Progressing  -SEGUNDO       User Key  (r) = Recorded By, (t) = Taken By, (c) = Cosigned  By    Initials Name Provider Type    SEGUNDO Mirza Roe, ATC           Therapy Education  Given: (P) HEP  Program: (P) Reinforced  How Provided: (P) Verbal  Provided to: (P) Patient  Level of Understanding: (P) Verbalized              Time Calculation:   Start Time: (P) 1102  Stop Time: (P) 1200  Time Calculation (min): (P) 58 min  Total Timed Code Minutes- PT: (P) 38 minute(s)  Therapy Suggested Charges     Code   Minutes Charges    None           Therapy Charges for Today     Code Description Service Date Service Provider Modifiers Qty    92009717565 HC PT THER SUPP EA 15 MIN 8/23/2018 Mirza Roe, ATC  1    15139008763 HC PT ELECTRICAL STIM UNATTENDED 8/23/2018 Mirza Roe, ATC  1    37521583715 HC PT THER PROC EA 15 MIN 8/23/2018 Mirza Roe, ATC  3                    Mirza Roe ATC  8/23/2018

## 2018-08-28 ENCOUNTER — HOSPITAL ENCOUNTER (OUTPATIENT)
Dept: PHYSICAL THERAPY | Facility: HOSPITAL | Age: 28
Setting detail: THERAPIES SERIES
Discharge: HOME | End: 2018-08-28

## 2018-08-28 DIAGNOSIS — S33.5XXA LUMBAR BACK SPRAIN, INITIAL ENCOUNTER: Primary | ICD-10-CM

## 2018-08-28 PROCEDURE — 97110 THERAPEUTIC EXERCISES: CPT

## 2018-08-28 NOTE — THERAPY DISCHARGE NOTE
Outpatient Physical Therapy Ortho Treatment Note/Discharge Summary  [facility]     Patient Name: Jonah Perez  : 1990  MRN: [id number]  Today's Date: 2018      Visit Date: 2018     Subjective Improvement 95%  Visits 5/5  Visits approved 5  RTMD PRN  Recert Date 2018    Low and mid back pain    Visit Dx:    ICD-10-CM ICD-9-CM   1. Lumbar back sprain, initial encounter S33.5XXA 847.2       Patient Active Problem List   Diagnosis   (none) - all problems resolved or deleted        Past Medical History:   Diagnosis Date   • Hypertension         Past Surgical History:   Procedure Laterality Date   • INCISION AND DRAINAGE ABSCESS Right 2017    Procedure: INCISION AND DRAINAGE ABSCESS right neck;  Surgeon: Jeramy Moss MD;  Location: [facility];  Service:    • TONSILLECTOMY               PT Ortho     Row Name 18 1000       Subjective Pain    Able to rate subjective pain? yes  -CP    Pre-Treatment Pain Level 0  -CP    Post-Treatment Pain Level 0  -CP       General ROM    GENERAL ROM COMMENTS lumbar AROM WNL.  Bilateral UE WNL  -CP      User Key  (r) = Recorded By, (t) = Taken By, (c) = Cosigned By    Initials Name Provider Type    Aline Mandel, PTA Physical Therapy Assistant                            PT Assessment/Plan     Row Name 18 1103          PT Assessment    Assessment Comments Patient has meet 3/4 goals.  Cont to report some ttp to right thoracic paraspinals  -CP        PT Plan    PT Plan Comments D/C to home and with HEP and one free month fitness membership  -CP       User Key  (r) = Recorded By, (t) = Taken By, (c) = Cosigned By    Initials Name Provider Type    Aline Mandel, PTA Physical Therapy Assistant                    Exercises     Row Name 18 1000             Subjective Comments    Subjective Comments Patient reports that he has been released to full duty and that today is his last day of therapy..  Patient just reports a general  tighness no pain  -CP         Subjective Pain    Able to rate subjective pain? yes  -CP      Pre-Treatment Pain Level 0  -CP      Post-Treatment Pain Level 0  -CP         Exercise 1    Exercise Name 1 Pro II level 3  -CP      Time 1 10  -CP      Additional Comments FW/BW  -CP         Exercise 2    Exercise Name 2 Doorway stretch  -CP      Sets 2 3  -CP      Time 2 30  -CP         Exercise 3    Exercise Name 3 cybex back ext  -CP      Sets 3 2  -CP      Reps 3 10  -CP      Time 3 70 lb  -CP         Exercise 4    Exercise Name 4 cybex incline plull  -CP      Sets 4 2  -CP      Reps 4 10  -CP      Time 4 70 lb  -CP         Exercise 5    Exercise Name 5 cybex row  -CP      Sets 5 2  -CP      Reps 5 10  -CP      Time 5 50 lb  -CP         Exercise 6    Exercise Name 6 mid back stretch  -CP      Sets 6 3  -CP      Time 6 30  -CP         Exercise 7    Exercise Name 7 review HEP  -CP         Exercise 8    Exercise Name 8 thoracic rotation with tball  -CP      Reps 8 10  -CP         Exercise 9    Exercise Name 9 supine towel stretch  -CP      Time 9 3  -CP        User Key  (r) = Recorded By, (t) = Taken By, (c) = Cosigned By    Initials Name Provider Type    Aline Mandel, PTA Physical Therapy Assistant                               PT OP Goals     Row Name 08/28/18 1000          PT Short Term Goals    STG Date to Achieve 09/05/18  -CP     STG 1 Pt will have normal lumbar mobility to prior level of function  -CP     STG 1 Progress Met  -CP     STG 2 Pt will be nontender over right thoracic paraspinals  -CP     STG 2 Progress Progressing  -CP     STG 3 Pt will have Mod Oswestry score 20% or lowe  -CP     STG 3 Progress Met  -CP     STG 4 pt will have painfree UE ROM  -CP     STG 4 Progress Met  -CP        Time Calculation    PT Goal Re-Cert Due Date 09/05/18  -CP       User Key  (r) = Recorded By, (t) = Taken By, (c) = Cosigned By    Initials Name Provider Type    Aline Mandel, [facility] Physical Therapy Assistant           Therapy Education  Education Details: supiine towel stretch  Given: HEP  Program: New  How Provided: Verbal, Demonstration, Written  Level of Understanding: Verbalized, Demonstrated    Outcome Measure Options: Modifed Owestry  Modified Oswestry  Modified Oswestry Score/Comments: 0      Time Calculation:   Start Time: 1020  Stop Time: 1100  Time Calculation (min): 40 min  Total Timed Code Minutes- PT: 40 minute(s)  Therapy Suggested Charges     Code   Minutes Charges    None           Therapy Charges for Today     Code Description Service Date Service Provider Modifiers Qty    79017954976 HC PT THER PROC EA 15 MIN 8/28/2018 Aline He, PTA GP 3          PT G-Codes  Outcome Measure Options: Modifed Owestry     OP PT Discharge Summary  Date of Discharge: 08/28/18  Reason for Discharge: Per MD order  Outcomes Achieved:  (patient has met 3/4 goals)  Discharge Destination: Home with home program (one month free fitness membership)      Aline He PTA  8/28/2018

## 2018-08-30 ENCOUNTER — APPOINTMENT (OUTPATIENT)
Dept: PHYSICAL THERAPY | Facility: HOSPITAL | Age: 28
End: 2018-08-30

## 2021-06-18 ENCOUNTER — TRANSCRIBE ORDERS (OUTPATIENT)
Dept: ADMINISTRATIVE | Facility: HOSPITAL | Age: 31
End: 2021-06-18

## 2021-06-18 DIAGNOSIS — R06.2 WHEEZES: Primary | ICD-10-CM

## 2021-06-25 ENCOUNTER — OFFICE VISIT (OUTPATIENT)
Dept: PULMONOLOGY | Facility: CLINIC | Age: 31
End: 2021-06-25

## 2021-06-25 DIAGNOSIS — R06.2 WHEEZING: ICD-10-CM

## 2021-06-25 PROCEDURE — 94729 DIFFUSING CAPACITY: CPT | Performed by: INTERNAL MEDICINE

## 2021-06-25 PROCEDURE — 94727 GAS DIL/WSHOT DETER LNG VOL: CPT | Performed by: INTERNAL MEDICINE

## 2021-06-25 PROCEDURE — 94010 BREATHING CAPACITY TEST: CPT | Performed by: INTERNAL MEDICINE

## 2021-06-25 NOTE — PROGRESS NOTES
Full pft performed. No post spirometry.     Good patient effort and cooperation. Exhalation time: 6 seconds on pre spirometry.     Ordered by GAVIN Poe; read by Dr. Sweta Ramirez.

## 2021-06-28 DIAGNOSIS — R06.2 WHEEZES: ICD-10-CM

## (undated) DEVICE — PK MAJ PROC LF 60

## (undated) DEVICE — 9165 UNIVERSAL PATIENT PLATE: Brand: 3M™

## (undated) DEVICE — GLV SURG SENSICARE GREEN W/ALOE PF LF 8 STRL

## (undated) DEVICE — GOWN,AURORA,NOREINF,RAGLAN,XL,STERILE: Brand: MEDLINE

## (undated) DEVICE — GOWN,PREVENTION PLUS,XLNG/XXLARGE,STRL: Brand: MEDLINE

## (undated) DEVICE — SOL IRR NACL 0.9PCT BT 1000ML

## (undated) DEVICE — PANT KNIT MATERN L/XL 2BG

## (undated) DEVICE — INTENDED FOR TISSUE SEPARATION, AND OTHER PROCEDURES THAT REQUIRE A SHARP SURGICAL BLADE TO PUNCTURE OR CUT.: Brand: BARD-PARKER ® CARBON RIB-BACK BLADES

## (undated) DEVICE — WRAP LEG 31X48X6IN STRL

## (undated) DEVICE — GLV SURG TRIUMPH LT PF LTX 6.5 STRL

## (undated) DEVICE — GLV SURG TRIUMPH NATURAL W/ALOE PF LTX 6 STRL

## (undated) DEVICE — SPNG GZ WOVN 4X4IN 12PLY 10/BX STRL

## (undated) DEVICE — GLV SURG TRIUMPH NATURAL W/ALOE PF LTX 7.5 STRL

## (undated) DEVICE — GLV SURG SENSICARE GREEN W/ALOE PF LF 7 STRL

## (undated) DEVICE — GLV SURG SENSICARE ALOE LF PF SZ7.5 GRN

## (undated) DEVICE — GAUZE,PACKING STRIP,IODOFORM,1/2"X5YD,ST: Brand: CURAD

## (undated) DEVICE — PREP PVP-I 7.5P BT 4OZ

## (undated) DEVICE — UNDRPD 23X36IN 10/PK

## (undated) DEVICE — PAD,ABDOMINAL,8"X10",ST,LF: Brand: MEDLINE

## (undated) DEVICE — GLV SURG TRIUMPH NATURAL W/ALOE PF LTX 7 STRL

## (undated) DEVICE — GLV SURG TRIUMPH LT PF LTX 8 STRL

## (undated) DEVICE — GLV SURG SENSICARE GREEN W/ALOE PF LF 8.5 STRL

## (undated) DEVICE — PREP SOL POVIDONE/IODINE BT 4OZ

## (undated) DEVICE — BANDAGE,GAUZE,BULKEE II,4.5"X4.1YD,STRL: Brand: MEDLINE